# Patient Record
Sex: MALE | Race: WHITE | NOT HISPANIC OR LATINO | URBAN - NONMETROPOLITAN AREA
[De-identification: names, ages, dates, MRNs, and addresses within clinical notes are randomized per-mention and may not be internally consistent; named-entity substitution may affect disease eponyms.]

---

## 2021-03-31 ENCOUNTER — OFFICE VISIT (OUTPATIENT)
Dept: URBAN - NONMETROPOLITAN AREA CLINIC 2 | Facility: CLINIC | Age: 66
End: 2021-03-31
Payer: MEDICARE

## 2021-03-31 ENCOUNTER — LAB OUTSIDE AN ENCOUNTER (OUTPATIENT)
Dept: URBAN - NONMETROPOLITAN AREA CLINIC 2 | Facility: CLINIC | Age: 66
End: 2021-03-31

## 2021-03-31 DIAGNOSIS — R79.89 ELEVATED LIVER FUNCTION TESTS: ICD-10-CM

## 2021-03-31 DIAGNOSIS — K74.60 UNSPECIFIED CIRRHOSIS OF LIVER: ICD-10-CM

## 2021-03-31 DIAGNOSIS — K75.81 NONALCOHOLIC STEATOHEPATITIS (NASH): ICD-10-CM

## 2021-03-31 DIAGNOSIS — D68.9 CLOTTING DISORDER: ICD-10-CM

## 2021-03-31 DIAGNOSIS — R50.9 FEVER, UNSPECIFIED FEVER CAUSE: ICD-10-CM

## 2021-03-31 DIAGNOSIS — K80.50 CHOLEDOCHOLITHIASIS: ICD-10-CM

## 2021-03-31 DIAGNOSIS — R11.2 NON-INTRACTABLE VOMITING WITH NAUSEA, UNSPECIFIED VOMITING TYPE: ICD-10-CM

## 2021-03-31 PROCEDURE — 99204 OFFICE O/P NEW MOD 45 MIN: CPT | Performed by: NURSE PRACTITIONER

## 2021-03-31 RX ORDER — ATORVASTATIN CALCIUM 40 MG/1
TABLET, FILM COATED ORAL
Qty: 90 UNSPECIFIED | Status: ACTIVE | COMMUNITY

## 2021-03-31 RX ORDER — LEVOTHYROXINE SODIUM 0.05 MG/1
TABLET ORAL
Qty: 90 UNSPECIFIED | Status: ACTIVE | COMMUNITY

## 2021-03-31 RX ORDER — MIRTAZAPINE 15 MG/1
TABLET, FILM COATED ORAL
Qty: 30 UNSPECIFIED | Status: ACTIVE | COMMUNITY

## 2021-03-31 RX ORDER — ENOXAPARIN SODIUM 100 MG/ML
INJECTION SUBCUTANEOUS
Qty: 6 UNSPECIFIED | Status: ACTIVE | COMMUNITY

## 2021-03-31 RX ORDER — DILTIAZEM HYDROCHLORIDE 120 MG/1
CAPSULE, EXTENDED RELEASE ORAL
Qty: 90 UNSPECIFIED | Status: ACTIVE | COMMUNITY

## 2021-03-31 RX ORDER — FINASTERIDE 5 MG/1
TABLET, FILM COATED ORAL
Qty: 30 UNSPECIFIED | Status: ACTIVE | COMMUNITY

## 2021-03-31 RX ORDER — DIGOXIN 250 UG/1
TABLET ORAL
Qty: 30 UNSPECIFIED | Status: ACTIVE | COMMUNITY

## 2021-03-31 RX ORDER — GABAPENTIN 300 MG/1
CAPSULE ORAL
Qty: 180 UNSPECIFIED | Status: ACTIVE | COMMUNITY

## 2021-03-31 RX ORDER — SERTRALINE HYDROCHLORIDE 50 MG/1
TABLET ORAL
Qty: 90 UNSPECIFIED | Status: ACTIVE | COMMUNITY

## 2021-03-31 RX ORDER — TRAZODONE HYDROCHLORIDE 100 MG/1
TABLET ORAL
Qty: 90 UNSPECIFIED | Status: ACTIVE | COMMUNITY

## 2021-03-31 RX ORDER — ONDANSETRON HYDROCHLORIDE 4 MG/1
1 TABLET AS NEEDED TABLET, FILM COATED ORAL ONCE A DAY
Qty: 30 TABLET | Refills: 1 | OUTPATIENT
Start: 2021-03-31

## 2021-03-31 RX ORDER — WARFARIN 4 MG/1
TABLET ORAL
Qty: 45 UNSPECIFIED | Status: ACTIVE | COMMUNITY

## 2021-03-31 RX ORDER — OMEPRAZOLE 40 MG/1
CAPSULE, DELAYED RELEASE ORAL
Qty: 90 UNSPECIFIED | Status: ACTIVE | COMMUNITY

## 2021-03-31 RX ORDER — ASPIRIN 325 MG/1
1 TABLET TABLET, FILM COATED ORAL ONCE A DAY
Status: ACTIVE | COMMUNITY

## 2021-03-31 RX ORDER — ALBUTEROL SULFATE 90 UG/1
1 PUFF AS NEEDED AEROSOL, METERED RESPIRATORY (INHALATION)
Status: ACTIVE | COMMUNITY

## 2021-03-31 RX ORDER — SACCHAROMYCES BOULARDII 250 MG
1 CAPSULE CAPSULE ORAL TWICE A DAY
Status: ACTIVE | COMMUNITY

## 2021-03-31 NOTE — PHYSICAL EXAM GASTROINTESTINAL
Abdomen  , soft, nontender, nondistended , no masses palpable , normal bowel sounds , no hepatomegaly present; ostomy present in the left lower quadrant.

## 2021-03-31 NOTE — PHYSICAL EXAM CONSTITUTIONAL:
alert , in no acute distress , well developed, well nourished, in good spirits and very pleasant male

## 2021-03-31 NOTE — HPI-TODAY'S VISIT:
3/31/21 Mr. Andrew Cullen is a very pleasant 65-year-old male who is referred by his PCP, Dr. Narvaez, for recurrent nausea vomiting.  He has episodes of cold chills followed by severe nausea with or without vomiting and then fever up to 101-102 that will persist from 12 hours to 2 days.  Once his fever breaks his symptoms will slowly improve leaving him fatigued for several days.  He has a poor appetite related to this.  Denies heartburn, abdominal pain, reflux, dysphagia.  No associated changes in his bowel habits with these episodes.  He often is prescribed antibiotics which seems to not have much effect as his symptoms will resolve without antibiotics as well.  He has been admitted multiple times for the symptoms but ultimately has been found to have other more concerning issues and has yet to come up with a diagnosis for the nausea vomiting and fever.  He has quite a complicated medical history.  He had acute liver failure in 2006 and was admitted to [facility] for several weeks, ultimately diagnosed with liver cirrhosis, ascites, and portal vein thrombosis and underwent TIPSS placement at that time.  He is not sure of the etiology regarding his cirrhosis.  He has never been a heavy drinker.  No history of hepatitis.  His wife supposes it is related to [place].  Unsure what type of work-up was completed to rule out other etiologies but suspect this was thorough at [facility].  Denies history of GI bleed.  Last EGD in 2018, noted to have duodenal ulcer; no esophageal varices.  He has had multiple ultrasounds and CTs over the last 2 years.  No trouble with confusion/history of hepatic encephalopathy.  Repeat CT in 2016 in [place] showed occlusion of his shunt.  This was cleaned out but he was told he had collaterals at that time as well.  He was admitted to [facility] in 2018 with nausea vomiting and fever.  At some point he was diagnosed with cholecystitis and had cholecystostomy tube placement.  He was considered high risk for surgery due to his liver cirrhosis and "concerned he may bleed to death".  During this admission his cholecystostomy tube was displaced and he underwent MRI that showed bile duct stones.  Ultimately, he did not undergo ERCP per his recall.  He has had multiple scans since that time and has been told "no gallbladder is seen."  Denies right upper quadrant abdominal pain.  He is unsure if his liver tests have been elevated recently.  No jaundice since his original diagnosis of cirrhosis.  He also has history of clotting disorder and follows with hematologist in [place].  Ultimately, he reports he has never been giving an actual diagnosis.  He does have history of atrial fibrillation and was on Pradaxa in the past.  He underwent watchman procedure in 2019.  During an admission for his nausea and vomiting he had a scan that showed a clot in his left atrial appendage just above his watchman.  He underwent surgery with thrombectomy to me and bypass x1.  He has been on warfarin since that time.  He has had multiple clots over several years.  He had perforated diverticulitis in 2018 requiring colon resection and colostomy.  This has not been reversed and due to high risk for surgeries.  He did have colonoscopy in 2019 that was normal per his report.  He has normal output via his ostomy.  No bleeding.  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  He is nondiabetic.  TG

## 2021-04-07 ENCOUNTER — TELEPHONE ENCOUNTER (OUTPATIENT)
Dept: URBAN - NONMETROPOLITAN AREA CLINIC 2 | Facility: CLINIC | Age: 66
End: 2021-04-07

## 2021-04-21 ENCOUNTER — OFFICE VISIT (OUTPATIENT)
Dept: URBAN - METROPOLITAN AREA CLINIC 54 | Facility: CLINIC | Age: 66
End: 2021-04-21

## 2021-04-22 ENCOUNTER — TELEPHONE ENCOUNTER (OUTPATIENT)
Dept: URBAN - NONMETROPOLITAN AREA CLINIC 2 | Facility: CLINIC | Age: 66
End: 2021-04-22

## 2021-04-27 ENCOUNTER — TELEPHONE ENCOUNTER (OUTPATIENT)
Dept: URBAN - NONMETROPOLITAN AREA CLINIC 2 | Facility: CLINIC | Age: 66
End: 2021-04-27

## 2021-04-28 ENCOUNTER — OFFICE VISIT (OUTPATIENT)
Dept: URBAN - NONMETROPOLITAN AREA CLINIC 2 | Facility: CLINIC | Age: 66
End: 2021-04-28

## 2022-01-01 ENCOUNTER — TELEPHONE ENCOUNTER (OUTPATIENT)
Dept: URBAN - METROPOLITAN AREA CLINIC 54 | Facility: CLINIC | Age: 67
End: 2022-01-01

## 2022-01-01 ENCOUNTER — DASHBOARD ENCOUNTERS (OUTPATIENT)
Age: 67
End: 2022-01-01

## 2022-01-01 ENCOUNTER — OFFICE VISIT (OUTPATIENT)
Dept: URBAN - METROPOLITAN AREA CLINIC 54 | Facility: CLINIC | Age: 67
End: 2022-01-01
Payer: MEDICARE

## 2022-01-01 ENCOUNTER — OFFICE VISIT (OUTPATIENT)
Dept: URBAN - METROPOLITAN AREA MEDICAL CENTER 23 | Facility: MEDICAL CENTER | Age: 67
End: 2022-01-01
Payer: MEDICARE

## 2022-01-01 ENCOUNTER — WEB ENCOUNTER (OUTPATIENT)
Dept: URBAN - METROPOLITAN AREA CLINIC 54 | Facility: CLINIC | Age: 67
End: 2022-01-01

## 2022-01-01 ENCOUNTER — OFFICE VISIT (OUTPATIENT)
Dept: URBAN - METROPOLITAN AREA CLINIC 54 | Facility: CLINIC | Age: 67
End: 2022-01-01

## 2022-01-01 VITALS
HEART RATE: 93 BPM | WEIGHT: 204 LBS | SYSTOLIC BLOOD PRESSURE: 98 MMHG | HEIGHT: 76 IN | TEMPERATURE: 97.2 F | BODY MASS INDEX: 24.84 KG/M2 | DIASTOLIC BLOOD PRESSURE: 69 MMHG

## 2022-01-01 DIAGNOSIS — K74.60 UNSPECIFIED CIRRHOSIS OF LIVER: ICD-10-CM

## 2022-01-01 DIAGNOSIS — K75.81 NONALCOHOLIC STEATOHEPATITIS (NASH): ICD-10-CM

## 2022-01-01 DIAGNOSIS — Z53.8 FAILED ATTEMPTED SURGICAL PROCEDURE: ICD-10-CM

## 2022-01-01 DIAGNOSIS — D64.89 ANEMIA DUE TO OTHER CAUSE: ICD-10-CM

## 2022-01-01 DIAGNOSIS — K29.60 ADENOPAPILLOMATOSIS GASTRICA: ICD-10-CM

## 2022-01-01 DIAGNOSIS — D68.9 CLOTTING DISORDER: ICD-10-CM

## 2022-01-01 DIAGNOSIS — R50.9 FEVER OF UNKNOWN ORIGIN: ICD-10-CM

## 2022-01-01 DIAGNOSIS — Z95.828 S/P TIPS (TRANSJUGULAR INTRAHEPATIC PORTOSYSTEMIC SHUNT): ICD-10-CM

## 2022-01-01 DIAGNOSIS — R11.2 ACUTE NAUSEA WITH NONBILIOUS VOMITING: ICD-10-CM

## 2022-01-01 PROCEDURE — 45330 DIAGNOSTIC SIGMOIDOSCOPY: CPT | Performed by: INTERNAL MEDICINE

## 2022-01-01 PROCEDURE — 99214 OFFICE O/P EST MOD 30 MIN: CPT

## 2022-01-01 PROCEDURE — 44388 COLONOSCOPY THRU STOMA SPX: CPT | Performed by: INTERNAL MEDICINE

## 2022-01-01 PROCEDURE — 43239 EGD BIOPSY SINGLE/MULTIPLE: CPT | Performed by: INTERNAL MEDICINE

## 2022-01-01 RX ORDER — GABAPENTIN 300 MG/1
CAPSULE ORAL
Qty: 180 UNSPECIFIED | Status: ACTIVE | COMMUNITY

## 2022-01-01 RX ORDER — OMEPRAZOLE 40 MG/1
CAPSULE, DELAYED RELEASE ORAL
Qty: 90 UNSPECIFIED | Status: ON HOLD | COMMUNITY

## 2022-01-01 RX ORDER — CIPROFLOXACIN 500 MG/5ML
2.5 ML KIT ORAL
Status: ACTIVE | COMMUNITY

## 2022-01-01 RX ORDER — SODIUM, POTASSIUM,MAG SULFATES 17.5-3.13G
354 ML SOLUTION, RECONSTITUTED, ORAL ORAL AS DIRECTED
Qty: 1 BOX | Refills: 0 | OUTPATIENT
Start: 2022-01-01 | End: 2022-01-01

## 2022-01-01 RX ORDER — DILTIAZEM HYDROCHLORIDE 120 MG/1
CAPSULE, EXTENDED RELEASE ORAL
Qty: 90 UNSPECIFIED | Status: ACTIVE | COMMUNITY

## 2022-01-01 RX ORDER — SACCHAROMYCES BOULARDII 250 MG
1 CAPSULE CAPSULE ORAL TWICE A DAY
Status: ACTIVE | COMMUNITY

## 2022-01-01 RX ORDER — ASPIRIN 325 MG/1
1 TABLET TABLET, FILM COATED ORAL ONCE A DAY
Status: ACTIVE | COMMUNITY

## 2022-01-01 RX ORDER — DOXYCYCLINE HYCLATE 100 MG/1
1 TABLET TABLET, COATED ORAL ONCE A DAY
Status: ACTIVE | COMMUNITY

## 2022-01-01 RX ORDER — DIGOXIN 250 UG/1
TABLET ORAL
Qty: 30 UNSPECIFIED | Status: ACTIVE | COMMUNITY

## 2022-01-01 RX ORDER — NITROFURANTOIN (MONOHYDRATE/MACROCRYSTALS) 75; 25 MG/1; MG/1
1 CAPSULE AT BEDTIME WITH FOOD CAPSULE ORAL ONCE A DAY
Status: ACTIVE | COMMUNITY

## 2022-01-01 RX ORDER — WARFARIN 4 MG/1
TABLET ORAL
Qty: 45 UNSPECIFIED | COMMUNITY

## 2022-01-01 RX ORDER — ONDANSETRON HYDROCHLORIDE 4 MG/1
1 TABLET AS NEEDED TABLET, FILM COATED ORAL ONCE A DAY
Qty: 30 TABLET | Refills: 1 | Status: ACTIVE | COMMUNITY
Start: 2021-03-31

## 2022-01-01 RX ORDER — ALBUTEROL SULFATE 90 UG/1
1 PUFF AS NEEDED AEROSOL, METERED RESPIRATORY (INHALATION)
Status: ACTIVE | COMMUNITY

## 2022-01-01 RX ORDER — FINASTERIDE 5 MG/1
TABLET, FILM COATED ORAL
Qty: 30 UNSPECIFIED | COMMUNITY

## 2022-01-01 RX ORDER — TAMSULOSIN HYDROCHLORIDE 0.4 MG/1
1 CAPSULE CAPSULE ORAL ONCE A DAY
Status: ACTIVE | COMMUNITY

## 2022-01-01 RX ORDER — MIRTAZAPINE 15 MG/1
TABLET, FILM COATED ORAL
Qty: 30 UNSPECIFIED | Status: ACTIVE | COMMUNITY

## 2022-01-01 RX ORDER — WARFARIN 4 MG/1
TABLET ORAL
Qty: 45 UNSPECIFIED | Status: ACTIVE | COMMUNITY

## 2022-01-01 RX ORDER — ASPIRIN 325 MG/1
1 TABLET TABLET, FILM COATED ORAL ONCE A DAY
COMMUNITY

## 2022-01-01 RX ORDER — FINASTERIDE 5 MG/1
TABLET, FILM COATED ORAL
Qty: 30 UNSPECIFIED | Status: ON HOLD | COMMUNITY

## 2022-01-01 RX ORDER — ENOXAPARIN SODIUM 100 MG/ML
INJECTION SUBCUTANEOUS
Qty: 6 UNSPECIFIED | Status: ON HOLD | COMMUNITY

## 2022-01-01 RX ORDER — LEVOTHYROXINE SODIUM 0.05 MG/1
TABLET ORAL
Qty: 90 UNSPECIFIED | Status: ACTIVE | COMMUNITY

## 2022-01-01 RX ORDER — TRAZODONE HYDROCHLORIDE 100 MG/1
TABLET ORAL
Qty: 90 UNSPECIFIED | Status: ACTIVE | COMMUNITY

## 2022-01-01 RX ORDER — ATORVASTATIN CALCIUM 40 MG/1
TABLET, FILM COATED ORAL
Qty: 90 UNSPECIFIED | Status: ACTIVE | COMMUNITY

## 2022-01-01 RX ORDER — WARFARIN SODIUM 5 MG/1
1 TABLET TABLET ORAL ONCE A DAY
Status: ACTIVE | COMMUNITY

## 2022-01-01 RX ORDER — SERTRALINE HYDROCHLORIDE 50 MG/1
TABLET ORAL
Qty: 90 UNSPECIFIED | Status: ACTIVE | COMMUNITY

## 2022-01-01 RX ORDER — SERTRALINE HYDROCHLORIDE 50 MG/1
TABLET ORAL
Qty: 90 UNSPECIFIED | COMMUNITY

## 2022-01-01 RX ORDER — DIGOXIN 250 UG/1
TABLET ORAL
Qty: 30 UNSPECIFIED | COMMUNITY

## 2022-01-01 RX ORDER — GABAPENTIN 300 MG/1
CAPSULE ORAL
Qty: 180 UNSPECIFIED | COMMUNITY

## 2022-01-01 RX ORDER — ATORVASTATIN CALCIUM 40 MG/1
TABLET, FILM COATED ORAL
Qty: 90 UNSPECIFIED | COMMUNITY

## 2022-01-01 RX ORDER — ENOXAPARIN SODIUM 100 MG/ML
INJECTION SUBCUTANEOUS
Qty: 6 UNSPECIFIED | COMMUNITY

## 2022-01-01 RX ORDER — DILTIAZEM HYDROCHLORIDE 120 MG/1
CAPSULE, EXTENDED RELEASE ORAL
Qty: 90 UNSPECIFIED | COMMUNITY

## 2022-01-01 RX ORDER — TRAZODONE HYDROCHLORIDE 100 MG/1
TABLET ORAL
Qty: 90 UNSPECIFIED | COMMUNITY

## 2022-01-01 RX ORDER — ALBUTEROL SULFATE 90 UG/1
1 PUFF AS NEEDED AEROSOL, METERED RESPIRATORY (INHALATION)
COMMUNITY

## 2022-01-01 RX ORDER — ONDANSETRON HYDROCHLORIDE 4 MG/1
1 TABLET AS NEEDED TABLET, FILM COATED ORAL ONCE A DAY
Qty: 30 TABLET | Refills: 1 | COMMUNITY
Start: 2021-03-31

## 2022-01-01 RX ORDER — SACCHAROMYCES BOULARDII 250 MG
1 CAPSULE CAPSULE ORAL TWICE A DAY
COMMUNITY

## 2022-01-01 RX ORDER — LEVOTHYROXINE SODIUM 0.05 MG/1
TABLET ORAL
Qty: 90 UNSPECIFIED | COMMUNITY

## 2022-01-01 RX ORDER — MIRTAZAPINE 15 MG/1
TABLET, FILM COATED ORAL
Qty: 30 UNSPECIFIED | COMMUNITY

## 2022-01-01 RX ORDER — OMEPRAZOLE 40 MG/1
CAPSULE, DELAYED RELEASE ORAL
Qty: 90 UNSPECIFIED | COMMUNITY

## 2022-03-15 PROBLEM — 161615003: Status: ACTIVE | Noted: 2022-01-01

## 2022-03-15 PROBLEM — 362970003: Status: ACTIVE | Noted: 2021-03-31

## 2022-03-15 PROBLEM — 266468003: Status: ACTIVE | Noted: 2021-03-31

## 2022-03-15 NOTE — HPI-TODAY'S VISIT:
3/31/21 Mr. Andrew Cullen is a very pleasant 65-year-old male who is referred by his PCP, Dr. Narvaez, for recurrent nausea vomiting.  He has episodes of cold chills followed by severe nausea with or without vomiting and then fever up to 101-102 that will persist from 12 hours to 2 days.  Once his fever breaks his symptoms will slowly improve leaving him fatigued for several days.  He has a poor appetite related to this.  Denies heartburn, abdominal pain, reflux, dysphagia.  No associated changes in his bowel habits with these episodes.  He often is prescribed antibiotics which seems to not have much effect as his symptoms will resolve without antibiotics as well.  He has been admitted multiple times for the symptoms but ultimately has been found to have other more concerning issues and has yet to come up with a diagnosis for the nausea vomiting and fever.  He has quite a complicated medical history.  He had acute liver failure in 2006 and was admitted to [facility] for several weeks, ultimately diagnosed with liver cirrhosis, ascites, and portal vein thrombosis and underwent TIPSS placement at that time.  He is not sure of the etiology regarding his cirrhosis.  He has never been a heavy drinker.  No history of hepatitis.  His wife supposes it is related to [place].  Unsure what type of work-up was completed to rule out other etiologies but suspect this was thorough at [facility].  Denies history of GI bleed.  Last EGD in 2018, noted to have duodenal ulcer; no esophageal varices.  He has had multiple ultrasounds and CTs over the last 2 years.  No trouble with confusion/history of hepatic encephalopathy.  Repeat CT in 2016 in [place] showed occlusion of his shunt.  This was cleaned out but he was told he had collaterals at that time as well.  He was admitted to [facility] in 2018 with nausea vomiting and fever.  At some point he was diagnosed with cholecystitis and had cholecystostomy tube placement.  He was considered high risk for surgery due to his liver cirrhosis and "concerned he may bleed to death".  During this admission his cholecystostomy tube was displaced and he underwent MRI that showed bile duct stones.  Ultimately, he did not undergo ERCP per his recall.  He has had multiple scans since that time and has been told "no gallbladder is seen."  Denies right upper quadrant abdominal pain.  He is unsure if his liver tests have been elevated recently.  No jaundice since his original diagnosis of cirrhosis.  He also has history of clotting disorder and follows with hematologist in [place].  Ultimately, he reports he has never been giving an actual diagnosis.  He does have history of atrial fibrillation and was on Pradaxa in the past.  He underwent watchman procedure in 2019.  During an admission for his nausea and vomiting he had a scan that showed a clot in his left atrial appendage just above his watchman.  He underwent surgery with thrombectomy to me and bypass x1.  He has been on warfarin since that time.  He has had multiple clots over several years.  He had perforated diverticulitis in 2018 requiring colon resection and colostomy.  This has not been reversed and due to high risk for surgeries.  He did have colonoscopy in 2019 that was normal per his report.  He has normal output via his ostomy.  No bleeding.  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  He is nondiabetic.  TG   3/15/22: Patient was referred by Dr. Lenora Delgadillo for recurrent fever. A copy of this document will be sent to the physician. Pt is a 65 yo male with extensive PMH including cirrhosis s/p TIPS, enummerated above. He was referred to us by ID for recurrent fever of unknown origin since 4/2021. Blood cultures resulted positive for E. coli in 1 out of 4 bottles, and Bartonella seroligies were positive. Pt has been on multiple rounds of abx. 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 He also has IgA nephropathy - which Nephrology is contributing to underlying illness. Pt was referred to [facility] ID Dr. Faust for a second opinion and they recommended a PET-CT scan (pending) and referral to GI for small bowel evaluation with VCE. Unclear why this is being requestsed. Also requesting evaluation of TIPS.  Today pt states he is feeling well overall. He was having high fevers with associated nausea and vomiting 2-3 times per week for several months. States he lost nearly 100 lbs unintentionally in a year. Pt was started on ciprofloxicin by Nephrology on 3/1 and has been asymptomatic since. No ascites, LE edema, jaundice, easy bleeding or bruising, or confusion. Last colonoscopy was 3 years ago, normal. Last EGD was in 4/2020 significant for HP negative gastritis and duodenitis. Last cscope is unknown. Pt has a Watchman device and is on Warfarin managed by hematology Dr. Wolf.  CT abd 4/2021: 1. No source of bacteremia is identified in the chest abdomen and pelvis. 2. Cirrhosis. Recannulation of portal vein. A TIPS is present which is probably occluded. 3. Moderate emphysema. 4. Noncalcified 4 mm right middle lobe pulmonary nodule. Follow-up study in 3-6 months may be of benefit.

## 2022-03-15 NOTE — PHYSICAL EXAM GASTROINTESTINAL
Abdomen , soft, nontender, nondistended , no guarding or rigidity , colostomy bag in LLQ with moderate amount of brown stool , normal bowel sounds , Liver and Spleen , no hepatomegaly present , no hepatosplenomegaly , liver nontender , spleen not palpable

## 2022-04-04 PROBLEM — 19943007: Status: ACTIVE | Noted: 2021-03-31

## 2024-08-12 NOTE — PHYSICAL EXAM HENT:
Head,  normocephalic,  atraumatic, Recently saw ortho and urgent care for flare-up of chronic back pain.  Ortho provided him with a physical therapy referral.  Saw Dr Cortez in 2022, was told he was not a surgical candidate at that time.  Moderate kyphosis at T11/12 per xray report, also hx of old thoracic compression fxs.